# Patient Record
Sex: MALE | Race: WHITE | NOT HISPANIC OR LATINO | Employment: STUDENT | URBAN - METROPOLITAN AREA
[De-identification: names, ages, dates, MRNs, and addresses within clinical notes are randomized per-mention and may not be internally consistent; named-entity substitution may affect disease eponyms.]

---

## 2018-02-28 NOTE — PROGRESS NOTES
History of Present Illness  ADHD Medication Check, 3-19 years (Brief): The patient is being seen for a routine clinic follow-up regarding medication for attention deficit hyperactivity disorder  Symptoms:  forgetfulness, but no short attention span, no impulsive behavior, no hyperactive behavior, no easy distractibility, no poor grades, no avoiding mental effort tasks, no difficulty remaining seated, no excessive talking and no interrupting others  Associated symptoms:  eats well but hard to gain weight, but no anorexia, no sleep disturbance, no palpitations, no moodiness, no anxiety and no tics  Review of Systems    Constitutional: no fever  ENT: no nasal discharge  Cardiovascular: no chest pain  Gastrointestinal: no abdominal pain  Neurological: no headache  Psychiatric: no anxiety and no sleep disturbances  Active Problems    1  Attention-deficit hyperactivity disorder (314 01) (F90 9)   2  Flu vaccine need (V04 81) (Z23)   3  Mouth sores (528 9) (K13 79)    Past Medical History    1  History of Acute pharyngitis (462) (J02 9)   2  History of ADHD, 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)   3  History of Contact Dermatitis Of The Face (692 9)   4  History of Contact Dermatitis Of The Face (692 9)   5  History of dermatophytosis (V12 09) (Z86 19)   6  History of Primary nocturnal enuresis (788 36) (N39 44)    Family History  Father    1  Family history of multiple myeloma (V16 7) (Z80 7)  Family History    2  Family history of ADHD, Combined Type    Social History    · Activities: Wrestling   · Currently in 8th grade   · Sports Activities Football    Current Meds   1  Amphetamine-Dextroamphet ER 25 MG Oral Capsule Extended Release 24 Hour   (Adderall XR); TAKE 1 CAPSULE DAILY FOR ADHD; Therapy: 37SEE4461 to (Evaluate:08Jun2016); Last SZ:52CWT2871 Ordered   2   Amphetamine-Dextroamphetamine 5 MG Oral Tablet (Adderall); TAKE 1 TABLET AT 3PM;   Therapy: 80QVB5879 to (Evaluate:26Sfe5927); Last Rx:87Jtk0003 Ordered   3  Griseofulvin Microsize 500 MG Oral Tablet; 1 Tablet Daily x 4 weeks; Therapy: 79DXZ7606 to (Last Rx:15Nad8115)  Requested for: 79CXW4537 Ordered   4  Oxistat 1 % External Cream (Oxiconazole Nitrate); Apply twice a day to the left arm for up   to 6 weeks; Therapy: 02CWB6228 to (Last Rx:86Jwr0564)  Requested for: 00VWK2631 Ordered    Allergies    1  No Known Drug Allergies    2  Bee sting    Vitals   Recorded: 38SRN4552 04:38PM   Temperature 98 8 F   Heart Rate 90   Respiration 20   Systolic 98   Diastolic 70   Height 5 ft    2-20 Stature Percentile 12 %   Weight 84 lb    2-20 Weight Percentile 7 %   BMI Calculated 16 41   BMI Percentile 10 %   BSA Calculated 1 29     Physical Exam    Constitutional - General appearance: underweight  Head and Face - Head and face: Normocephalic atraumatic  Eyes - Conjunctiva and lids: Conjunctiva noninjected, no eye discharge and no swelling  Ears, Nose, Mouth, and Throat - Otoscopic examination: Tympanic membrane is pearly gray and nonbulging without discharge  Nasal mucosa, septum, and turbinates: Normal, no edema, no nasal discharge, nares not pale or boggy  Oropharynx: Oropharynx without ulcer, exudate or erythema, moist mucous membranes  Pulmonary - Auscultation of lungs: Clear to auscultation bilaterally without wheeze, rales, or rhonchi  Cardiovascular - Auscultation of heart: Regular rate and rhythm, no murmur  Abdomen - Abdomen: Normal bowel sounds, soft, nondistended, nontender, no organomegaly  Liver and spleen: No hepatomegaly or splenomegaly  Musculoskeletal - Gait and station: Normal gait  Skin - Skin and subcutaneous tissue: No rash , no bruising, no pallor, cyanosis, or icterus  Results/Data  Evoked Otoacoustic Emissions 06EZM8155 04:40PM Romina Indra     Test Name Result Flag Reference   EVOKED OTOACOUSTIC EMISSION bi lat pass         Assessment    1  Currently in 8th grade   2   Well child visit (V20 2) (Z00 129) 3  Attention-deficit hyperactivity disorder (314 01) (F90 9)    Plan  Health Maintenance    · (1) CBC/PLT/DIFF; Status:Active; Requested for:2016;    Perform:[facility]; Due:2017;Ordered;  For:Health Maintenance; Ordered By:Gabby Milner;   · (1) COMPREHENSIVE METABOLIC PANEL; Status:Active; Requested for:2016;    Perform:[facility]; Due:2017;Ordered;  For:Health Maintenance; Ordered By:Gabby Milner;   · (1) LIPID PANEL, FASTING; Status:Active; Requested for:2016;    Perform:[facility]; Due:2017;Ordered;  For:Health Maintenance; Ordered By:Gabby Milner;   · (1) T4, FREE; Status:Active; Requested for:2016;    Perform:[facility]; Due:2017;Ordered;  For:Health Maintenance; Ordered By:Gabby Milner;   · (1) TSH; Status:Active; Requested for:2016;    Perform:[facility]; [id number];RWJYBBT;  For:Health Maintenance; Ordered By:Gabby Milner;   · Evoked Otoacoustic Emissions; Status:Complete - Retrospective Authorization;   Done:  15YMA1863 04:40PM   Performed: In Office; TXC:[id number]; Last Updated By:Chandler Mondragon; 2016 4:41:54 PM;Ordered;  For:Health Maintenance; Ordered By:Gabby Milner;   · SNELLEN VISION- POC; Status:Complete - Retrospective Authorization;   Done:  37WTJ4663   Perform: In Office; ZINA:[id number]; Last Updated By:Chandler Mondragon; 2016 4:41:54 PM;Ordered; Today;  For:Health Maintenance; Ordered By:Gabby Milner;  SocHx: Sports [employer], Health Maintenance    · Stop: Gardasil 9 Intramuscular Suspension   For: SocHx: Sports Mtime Football, Health Maintenance; Ordered By:Gabby Milner; Effective Date:2016    Discussion/Summary    Will keep the same dose of Adderall XR 25 mg  Mom says he will stop medicine during summer  He refused to take the afternoon dose of short acting Adderall  Mom said with his last teacher's conference the teacher said his only problem is he forgets to bring his assignments home other than that he is capable of learning        Signatures Electronically signed by : TARSHA Lucas ; May 17 2016  7:18PM EST                       (Author)

## 2018-02-28 NOTE — PROGRESS NOTES
Chief Complaint  13yr [facility]      History of Present Illness  ADHD (Follow-Up): Target Symptoms: Medication side effects include no headache, no tics, no problems sleeping and no abdominal pain  HM, 12-18 years, Male [address] [place] [address] [address]: The patient comes in today for routine health maintenance with his mother  Dental care includes regular dental visits  Current diet includes a normal healthy diet  Dietary supplements:  no daily multivitamins  No nutritional concerns are expressed  No sleep concerns are reported  snoring  His temperament is described as happy  No behavioral concerns are noted  Safety elements used:  seat belt, safety helmet, sun safety, smoke detectors and carbon monoxide detectors  Sports include football and wrestling  Review of Systems    Constitutional: no fever  Eyes: no purulent discharge from the eyes  ENT: no nasal discharge and no sore throat  Cardiovascular: no chest pain  Gastrointestinal: no abdominal pain  Genitourinary: no dysuria  Musculoskeletal: no myalgias  Integumentary: no rashes  Neurological: no headache  Active Problems    1  Attention-deficit hyperactivity disorder (314 01) (F90 9)   2  Flu vaccine need (V04 81) (Z23)   3  Mouth sores (528 9) (K13 79)    Past Medical History    · History of Acute pharyngitis (462) (J02 9)   · History of ADHD, 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)   · History of Contact Dermatitis Of The Face (692 9)   · History of Contact Dermatitis Of The Face (692 9)   · History of dermatophytosis (V12 09) (Z86 19)   · History of Primary nocturnal enuresis (788 36) (N39 44)    Family History  Father    · Family history of multiple myeloma (V16 7) (Z80 7)  Family History    · Family history of ADHD, Combined Type    Social History    · Activities: Wrestling   · Currently in 8th grade   · Sports Activities Football    Current Meds   1   Amphetamine-Dextroamphet ER 25 MG Oral Capsule Extended Release 24 Hour; TAKE   1 CAPSULE DAILY FOR ADHD; Therapy: 94LRL4113 to (Evaluate:08Jun2016); Last SR:70MMH4134 Ordered   2  Amphetamine-Dextroamphetamine 5 MG Oral Tablet; TAKE 1 TABLET AT 3PM;   Therapy: 77QKE4750 to (Evaluate:20Afj8548); Last Rx:16Nov2015 Ordered   3  Griseofulvin Microsize 500 MG Oral Tablet; 1 Tablet Daily x 4 weeks; Therapy: 72JJW4305 to (Last Rx:22Xoq1556)  Requested for: 79FGT7036 Ordered   4  Oxistat 1 % External Cream; Apply twice a day to the left arm for up to 6 weeks; Therapy: 92OJW2578 to (Last Rx:42Gbx2150)  Requested for: 35XDM2555 Ordered    Allergies    1  No Known Drug Allergies    2  Bee sting    Vitals   Recorded: 80TQX0208 04:38PM   Temperature 98 8 F   Heart Rate 90   Respiration 20   Systolic 98   Diastolic 70   Height 5 ft    2-20 Stature Percentile 12 %   Weight 84 lb    2-20 Weight Percentile 7 %   BMI Calculated 16 41   BMI Percentile 10 %   BSA Calculated 1 29     Physical Exam    Constitutional - General appearance: underweight  Head and Face - Head and face: Normocephalic atraumatic  Eyes - Pupils and irises:  Pupils: equal, round, and reactive to light bilaterally  Cornea, Lens, and Sclera: Bilateral eyes: normal  Conjunctiva and lids: Conjunctiva noninjected, no eye discharge and no swelling  Ears, Nose, Mouth, and Throat - Otoscopic examination: Tympanic membrane is pearly gray and nonbulging without discharge  Nasal mucosa, septum, and turbinates: Normal, no edema, no nasal discharge, nares not pale or boggy  Oropharynx: Oropharynx without ulcer, exudate or erythema, moist mucous membranes  Neck - Neck: Supple  Pulmonary - Auscultation of lungs: Clear to auscultation bilaterally without wheeze, rales, or rhonchi  Cardiovascular - Auscultation of heart: Regular rate and rhythm, no murmur  Chest - Chest: Normal    Abdomen - Abdomen: Normal bowel sounds, soft, nondistended, nontender, no organomegaly  Liver and spleen: No hepatomegaly or splenomegaly     Genitourinary - Scrotal contents: Normal; testes descended bilaterally, no hydrocele  Penis: Normal, no lesions  Lymphatic - Palpation of lymph nodes in neck: No anterior or posterior cervical lymphadenopathy  Palpation of lymph nodes in axillae: No lymphadenopathy  Palpation of lymph nodes in groin: No lymphadenopathy  Musculoskeletal - Gait and station: Normal gait  Digits and nails: Capillary Refill < 2 sec, no petechie or purpura  Inspection/palpation of joints, bones, and muscles: No joint swelling, warm and well perfused  Evaluation for scoliosis: No scoliosis on exam  Full range of motion in all extremities  Stability: No joint instability  Muscle strength/tone: No hypertonia or hypotonia  Skin - Skin and subcutaneous tissue: No rash , no bruising, no pallor, cyanosis, or icterus  Neurologic - Reflexes:  Deep tendon reflexes: 2+ right biceps, 2+ left biceps, 2+ right patella and 2+ left patella  Results/Data  Evoked Otoacoustic Emissions 68DEP1355 04:40PM Inez Cook     Test Name Result Flag Reference   EVOKED OTOACOUSTIC EMISSION bi lat pass         Procedure    Procedure: Visual Acuity Test    Indication: routine screening  Inforrmation supplied by a Snellen chart  Results: 20/20 in both eyes without corrective device, 20/20 in the right eye without corrective device, 20/20 in the left eye without corrective device normal in both eyes  Color vision was and the results were normal    The patient tolerated the procedure well  There were no complications  Procedure: Hearing Acuity Test    Indication: Routine screeing  Audiometry: Normal bilaterally  The patient Tolerated the procedure well  There were no complications  Assessment    1  Currently in 8th grade   2  Well child visit (V20 2) (Z00 129)   3  Attention-deficit hyperactivity disorder (314 01) (F90 9)    Plan  Health Maintenance    · (1) CBC/PLT/DIFF; Status:Active;  Requested for:97Zim4707;    Perform:[facility]; FPL:23XOV2823;XBRFRVO;  For:Health Maintenance; Ordered By:Gabby Milner;   · (1) COMPREHENSIVE METABOLIC PANEL; Status:Active; Requested for:17May2016;    Perform:[facility]; Due:17May2017;Ordered;  For:Health Maintenance; Ordered By:Gabby Milner;   · (1) LIPID PANEL, FASTING; Status:Active; Requested for:17May2016;    Perform:[facility]; Due:17May2017;Ordered;  For:Health Maintenance; Ordered By:Gabby Milner;   · (1) T4, FREE; Status:Active; Requested for:17May2016;    Perform:[facility]; Due:17May2017;Ordered;  For:Health Maintenance; Ordered By:Gabby Milner;   · (1) TSH; Status:Active; Requested for:17May2016;    Perform:[facility]; POS:[id number];FYZHWDB;  For:Health Maintenance; Ordered By:Gabby Milner;   · Evoked Otoacoustic Emissions; Status:Complete - Retrospective Authorization;   Done:  74PVP7587 04:40PM   Performed: In Office; DSP:99OXM0856; Last Updated By:Amaury Mondragon; 5/17/2016 4:41:54 PM;Ordered;  For:Health Maintenance; Ordered By:Gabby Milner;   · SNELLEN VISION- POC; Status:Complete - Retrospective Authorization;   Done:  95JJA4712   Perform: In Office; JON:[id number]; Last Updated By:Amaury Mondragon; 5/17/2016 4:41:54 PM;Ordered; Today;  For:Health Maintenance; Ordered By:Gabby Milner;  SocHx: Sports [employer], Health Maintenance    · Stop: Gardasil 9 Intramuscular Suspension   For: SocHx: Sports Activities Football, Health Maintenance; Ordered By:Gabby Milner; Effective Date:17May2016    Discussion/Summary    Impression:   No elimination, feeding, skin and sleep concerns  small and underweight  will not take adderall summer time so he will gain weight Mom asked information about the HPV might consider later  Information discussed with patient, mother and about growth chart and diet  Immunization Counseling The parent/guardian was counseled on the following vaccine components: Gardasil 9        Signatures   Electronically signed by : TARSHA Kearney ; May 17 2016  7:23PM EST                       (Author)

## 2019-01-31 VITALS
WEIGHT: 120 LBS | HEIGHT: 68 IN | SYSTOLIC BLOOD PRESSURE: 123 MMHG | HEART RATE: 85 BPM | BODY MASS INDEX: 18.19 KG/M2 | DIASTOLIC BLOOD PRESSURE: 73 MMHG

## 2019-01-31 DIAGNOSIS — S43.001A ACQUIRED SUBLUXATION OF RIGHT SHOULDER, INITIAL ENCOUNTER: Primary | ICD-10-CM

## 2019-01-31 PROCEDURE — 99203 OFFICE O/P NEW LOW 30 MIN: CPT | Performed by: ORTHOPAEDIC SURGERY

## 2019-01-31 RX ORDER — EPINEPHRINE 0.3 MG/.3ML
INJECTION SUBCUTANEOUS
COMMUNITY
Start: 2016-06-13

## 2019-01-31 RX ORDER — DEXTROAMPHETAMINE SACCHARATE, AMPHETAMINE ASPARTATE MONOHYDRATE, DEXTROAMPHETAMINE SULFATE AND AMPHETAMINE SULFATE 6.25; 6.25; 6.25; 6.25 MG/1; MG/1; MG/1; MG/1
1 CAPSULE, EXTENDED RELEASE ORAL DAILY
COMMUNITY
Start: 2013-09-30

## 2019-01-31 RX ORDER — GRISEOFULVIN 500 MG/1
TABLET ORAL
COMMUNITY
Start: 2015-02-14 | End: 2019-01-31 | Stop reason: ALTCHOICE

## 2019-01-31 NOTE — LETTER
January 31, 2019     Patient: Claudia ePrez   YOB: 2002   Date of Visit: 1/31/2019       To Whom it May Concern:    Claudia Perez is under my professional care  He was seen in my office on 1/31/2019  He may return to gym class or sports on 1/31/18  If you have any questions or concerns, please don't hesitate to call           Sincerely,          David Palacio, DO

## 2019-02-01 NOTE — PROGRESS NOTES
Assessment/Plan:  1  Acquired subluxation of right shoulder, initial encounter       Roosevelt Edmond appears to have suffered a mild right shoulder subluxation  I do not think he fully dislocated  He does have some soreness along the rotator cuff but he does seem to be doing much better  I do think he can continue to work on rotator cuff strength and range of motion with his athletic trainers  I have cleared him to begin wrestling as tolerated with his athletic trainers  He does not have any discomfort along the [facility] joint whatsoever, I do not think he has suffered a sprain of that joint  He will follow up with me as needed and will continue treatment with athletic trainers going forward  Subjective:   Gaurang Huizar is a 12 y o  male who presents for evaluation for right shoulder injury  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[employer]  He states that last week he was wrestling in a match and when to shoot on his opponent with his outstretched right arm and felt a pop in his right shoulder  He felt like something "shifted"  He did not have a dislocation and was able to continue the match  Afterwards he was experiencing some discomfort deep within the shoulder  He would have some pain with movement  He did go to the emergency room at [facility] where he had an x-ray which was normal   He was diagnosed with an AC sprain  His  took a look at him the next day and did not feel that he had any pain over the [facility] joint and did have me evaluate him on the sideline at the wrestling match last night  Today he complains of a very mild intermittent discomfort in his right shoulder  He has no pain at rest   He states he is able to move his shoulder without pain and has had no more episodes of popping or deep discomfort  He states he has always had hyper mobile shoulders and is very flexible guide eye  He denies any history of shoulder dislocation    He denies any numbness or tingling or radiating pain down his arm  Review of Systems   Constitutional: Negative for chills, fever and unexpected weight change  HENT: Negative for hearing loss, nosebleeds and sore throat  Eyes: Negative for pain, redness and visual disturbance  Respiratory: Negative for cough, shortness of breath and wheezing  Cardiovascular: Negative for chest pain, palpitations and leg swelling  Gastrointestinal: Negative for abdominal pain, nausea and vomiting  Endocrine: Negative for polyphagia and polyuria  Genitourinary: Negative for dysuria and hematuria  Musculoskeletal:        See HPI   Skin: Negative for rash and wound  Neurological: Negative for dizziness, numbness and headaches  Psychiatric/Behavioral: Negative for decreased concentration and suicidal ideas  The patient is not nervous/anxious  No past medical history on file  No past surgical history on file  Family History   Problem Relation Age of Onset    Thyroid disease Mother     Multiple myeloma Father     No Known Problems Sister     No Known Problems Brother     No Known Problems Maternal Aunt     No Known Problems Maternal Uncle     No Known Problems Paternal Aunt     No Known Problems Paternal Uncle     Diabetes Maternal Grandmother     Heart disease Maternal Grandfather     Cancer Maternal Grandfather     No Known Problems Paternal Grandmother     No Known Problems Paternal Grandfather     ADD / ADHD Neg Hx     Anesthesia problems Neg Hx     Clotting disorder Neg Hx     Collagen disease Neg Hx     Dislocations Neg Hx     Learning disabilities Neg Hx     Neurological problems Neg Hx     Osteoporosis Neg Hx     Rheumatologic disease Neg Hx     Scoliosis Neg Hx     Vascular Disease Neg Hx        Social History     Occupational History    Not on file       Social History Main Topics    Smoking status: Never Smoker    Smokeless tobacco: Never Used    Alcohol use No    Drug use: No    Sexual activity: Not on file Current Outpatient Prescriptions:     amphetamine-dextroamphetamine (ADDERALL XR) 25 MG 24 hr capsule, Take 1 capsule by mouth daily, Disp: , Rfl:     EPINEPHrine (EPIPEN 2-BINU) 0 3 mg/0 3 mL SOAJ, Inject as directed, Disp: , Rfl:     Allergies   Allergen Reactions    Bee Venom     Poison Ivy Extract Swelling       Objective:  Vitals:    01/31/19 1516   BP: (!) 123/73   Pulse: 85       Right Shoulder Exam     Tenderness   The patient is experiencing no tenderness (No tenderness to palpation over the right AC joint)  Range of Motion   Active Abduction: normal   Passive Abduction: normal   Extension: normal   Forward Flexion: normal   External Rotation: normal   Internal Rotation 0 degrees:  Mid thoracic normal     Muscle Strength   The patient has normal right shoulder strength  Abduction: 5/5   Internal Rotation: 5/5   External Rotation: 5/5   Supraspinatus: 5/5   Subscapularis: 5/5   Biceps: 5/5     Tests   Apprehension: positive  Cross Arm: negative  Drop Arm: negative  Hawkin's test: negative  Impingement: negative    Other   Erythema: absent  Sensation: normal  Pulse: present    Comments:  Slight discomfort with apprehension test   Hypermobile shoulders bilaterally with load and shift test without pain  Physical Exam   Constitutional: He is oriented to person, place, and time  He appears well-developed and well-nourished  HENT:   Head: Normocephalic and atraumatic  Eyes: Pupils are equal, round, and reactive to light  Conjunctivae are normal    Neck: Normal range of motion  Neck supple  Cardiovascular: Normal rate and intact distal pulses  Pulmonary/Chest: Effort normal  No respiratory distress  Musculoskeletal:   As noted in HPI   Neurological: He is alert and oriented to person, place, and time  Skin: Skin is warm and dry  Psychiatric: He has a normal mood and affect  His behavior is normal    Vitals reviewed